# Patient Record
Sex: FEMALE | Race: BLACK OR AFRICAN AMERICAN | Employment: UNEMPLOYED | URBAN - METROPOLITAN AREA
[De-identification: names, ages, dates, MRNs, and addresses within clinical notes are randomized per-mention and may not be internally consistent; named-entity substitution may affect disease eponyms.]

---

## 2018-10-09 ENCOUNTER — OFFICE VISIT (OUTPATIENT)
Dept: BARIATRICS | Facility: CLINIC | Age: 38
End: 2018-10-09
Payer: COMMERCIAL

## 2018-10-09 VITALS
RESPIRATION RATE: 14 BRPM | WEIGHT: 172.4 LBS | BODY MASS INDEX: 31.73 KG/M2 | TEMPERATURE: 97.7 F | HEIGHT: 62 IN | SYSTOLIC BLOOD PRESSURE: 116 MMHG | HEART RATE: 68 BPM | DIASTOLIC BLOOD PRESSURE: 60 MMHG

## 2018-10-09 DIAGNOSIS — K21.9 GASTROESOPHAGEAL REFLUX DISEASE, ESOPHAGITIS PRESENCE NOT SPECIFIED: ICD-10-CM

## 2018-10-09 DIAGNOSIS — R63.5 ABNORMAL WEIGHT GAIN: Primary | ICD-10-CM

## 2018-10-09 DIAGNOSIS — E55.9 VITAMIN D DEFICIENCY: ICD-10-CM

## 2018-10-09 DIAGNOSIS — G43.109 MIGRAINE WITH AURA AND WITHOUT STATUS MIGRAINOSUS, NOT INTRACTABLE: ICD-10-CM

## 2018-10-09 DIAGNOSIS — E66.9 CLASS 1 OBESITY: ICD-10-CM

## 2018-10-09 PROCEDURE — 99204 OFFICE O/P NEW MOD 45 MIN: CPT | Performed by: PHYSICIAN ASSISTANT

## 2018-10-09 RX ORDER — IBUPROFEN 200 MG
TABLET ORAL EVERY 6 HOURS PRN
COMMUNITY

## 2018-10-09 RX ORDER — ERGOCALCIFEROL 1.25 MG/1
50000 CAPSULE ORAL WEEKLY
Qty: 4 CAPSULE | Refills: 0 | Status: SHIPPED | OUTPATIENT
Start: 2018-10-09

## 2018-10-09 NOTE — PROGRESS NOTES
Assessment/Plan:    Class 1 obesity  -Discussed options of [facility]-Intensive Lifestyle Intervention Program, Very Low Calorie Diet-VLCD and Conservative Program and the role of weight loss medications   -Initial weight loss goal of 5-10% weight loss for improved health  -Screening labs: Check fasting insulin, otherwise within acceptable limits from care everywhere  -Would be cautious with Topamax due to hx of nephrolithiasis (occured on one occasion in 2003)  -Patient is interested in pursuing Very Low Calorie Diet-VLCD with transition to [facility]   -Of note, patient avoids pork product    Labs ordered: yes- only due for fasting insulin- no C-I to starting VLCD  HTN meds addressed: n/a  DM2 meds addressed: n/a  VLCD time restriction based on BMI: n/a    Vitamin D deficiency  -20 in 05/2018  -start ergocalciferol x4 weeks then OTC vitamin D 4957-6019 IU daily for maintenance  -recheck in January- Recommend checking lab coverage before having labs drawn    Migraine with aura and without status migrainosus, not intractable  -occur infrequently  -minimizes use of artificial sweeteners, but never noticed correlation    GERD (gastroesophageal reflux disease)  -avoid triggers as discussed  -may improve with weight loss and dietary changes    Follow up for VLCD start    45 minute visit, >50% time spent counseling patient on nonsurgical interventions for the treatment of excess weight  Discussed in detail nonsurgical options including intensive lifestyle intervention program, very low-calorie diet program and conservative program   Discussed the role of weight loss medications  Counseled patient on diet behavior and exercise modification for weight loss  Goals:  Food log (ie ) www [email or web address]  [email or web address]  [email or web address],etc  baritastic  No sugary beverages  At least 80 oz of water daily    Recommend checking lab coverage before having labs drawn  After prescription vitamin D complete take 6231-6042 IU OTC vitamin D daily for maintenance or 88457 IU weekly    Diagnoses and all orders for this visit:    Abnormal weight gain  -     Insulin, fasting; Future    Vitamin D deficiency  -     ergocalciferol (VITAMIN D2) 50,000 units; Take 1 capsule (50,000 Units total) by mouth once a week  -     Vitamin D 25 hydroxy; Future  -     Insulin, fasting; Future    Class 1 obesity  -     Insulin, fasting; Future    Body mass index 31 0-31 9, adult    Migraine with aura and without status migrainosus, not intractable    Gastroesophageal reflux disease, esophagitis presence not specified    Other orders  -     ibuprofen (MOTRIN) 200 mg tablet; Take by mouth every 6 (six) hours as needed for mild pain    Subjective:   Chief Complaint   Patient presents with    Consult     MWM consult      Patient ID: Ronell Nissen  is a 41 y o  female with excess weight/obesity here to pursue weight management  Accompanied by her , who is also here for MWM  Past Medical History:   Diagnosis Date    GERD (gastroesophageal reflux disease)     Migraines     Nephrolithiasis     Stones 2003     HPI:  Obesity/Excess Weight:  Severity: Mild  Onset:  College and then pregnancy (25)    Modifiers: Diet and Exercise and 21 day fix  Contributing factors: Insufficient Physical Activity, Pregnancy and snacking, enjoys sweets  Associated symptoms: increased joint pain and increased shortness of breath    Goals: 135-140  Hydration: almost exclusively drinks water  Occasional coffee or juice  Alcohol: few drinks per month    The following portions of the patient's history were reviewed and updated as appropriate: allergies, current medications, past family history, past medical history, past social history, past surgical history and problem list     Review of Systems   Constitutional: Negative for chills and fever  HENT: Negative for sore throat      Respiratory: Positive for shortness of breath (attributed to weight loss, recommend eval if sx persist or worsen)  Negative for cough  Cardiovascular: Negative for chest pain and palpitations  Gastrointestinal: Negative for abdominal pain, constipation, diarrhea, nausea and vomiting  +GERD 1-2x/week- food triggers   Genitourinary: Negative for dysuria  Musculoskeletal: Positive for arthralgias  Skin: Negative for rash  Neurological: Positive for headaches (hx of migraines- not believed to be attributed to artificial sweeteners)  Psychiatric/Behavioral: Negative for suicidal ideas (Denies HI)  Objective:    /60 (BP Location: Right arm, Patient Position: Sitting, Cuff Size: Adult)   Pulse 68   Temp 97 7 °F (36 5 °C) (Tympanic)   Resp 14   Ht 5' 2" (1 575 m)   Wt 78 2 kg (172 lb 6 4 oz)   BMI 31 53 kg/m²     Physical Exam   Nursing note and vitals reviewed  Constitutional   General appearance: Abnormal   well developed and obese  Eyes No conjunctival pallor  Ears, Nose, Mouth, and Throat Oral mucosa moist    Pulmonary   Respiratory effort: No increased work of breathing or signs of respiratory distress  Auscultation of lungs: Clear to auscultation, equal breath sounds bilaterally, no wheezes, no rales, no rhonci  Cardiovascular   Auscultation of heart: Normal rate and rhythm, normal S1 and S2, without murmurs  Examination of extremities for edema and/or varicosities: Normal   no edema  Abdomen   Abdomen: Abnormal   The abdomen was obese  Bowel sounds were normal  The abdomen was soft and nontender     Musculoskeletal   Gait and station: Normal     Psychiatric   Orientation to person, place and time: Normal     Affect: appropriate

## 2018-10-09 NOTE — ASSESSMENT & PLAN NOTE
-Discussed options of [facility]-Intensive Lifestyle Intervention Program, Very Low Calorie Diet-VLCD and Conservative Program and the role of weight loss medications   -Initial weight loss goal of 5-10% weight loss for improved health  -Screening labs: Check fasting insulin, otherwise within acceptable limits from care everywhere  -Would be cautious with Topamax due to hx of nephrolithiasis (occured on one occasion in 2003)  -Patient is interested in pursuing Very Low Calorie Diet-VLCD with transition to [facility]   -Of note, patient avoids pork product    Labs ordered: yes- only due for fasting insulin- no C-I to starting VLCD  HTN meds addressed: n/a  DM2 meds addressed: n/a  VLCD time restriction based on BMI: n/a

## 2018-10-09 NOTE — ASSESSMENT & PLAN NOTE
-20 in 05/2018  -start ergocalciferol x4 weeks then OTC vitamin D 9552-5823 IU daily for maintenance  -recheck in January- Recommend checking lab coverage before having labs drawn

## 2018-10-12 ENCOUNTER — TELEPHONE (OUTPATIENT)
Dept: BARIATRICS | Facility: CLINIC | Age: 38
End: 2018-10-12

## 2018-10-12 NOTE — TELEPHONE ENCOUNTER
You are seeing the pt for VLCD start on 10/15/18  Received the fasting insulin from [employer]  Please let her know that it was within acceptable limits  Thank you! To be scanned into epic   Insulin - 13 7

## 2018-10-15 ENCOUNTER — OFFICE VISIT (OUTPATIENT)
Dept: BARIATRICS | Facility: CLINIC | Age: 38
End: 2018-10-15

## 2018-10-15 VITALS — BODY MASS INDEX: 32.13 KG/M2 | HEIGHT: 62 IN | WEIGHT: 174.6 LBS

## 2018-10-15 DIAGNOSIS — R63.5 ABNORMAL WEIGHT GAIN: ICD-10-CM

## 2018-10-15 PROCEDURE — VLCD

## 2018-10-15 PROCEDURE — RECHECK

## 2018-10-15 NOTE — PROGRESS NOTES
Initial RD session for VLCD completed  Components of diet discussed including ketosis, hydration, possible side effects  2 weeks of product ordered and received  Will f/u 10/18 via phone

## 2018-10-18 ENCOUNTER — PATIENT OUTREACH (OUTPATIENT)
Dept: BARIATRICS | Facility: CLINIC | Age: 38
End: 2018-10-18

## 2018-10-30 ENCOUNTER — OFFICE VISIT (OUTPATIENT)
Dept: BARIATRICS | Facility: CLINIC | Age: 38
End: 2018-10-30

## 2018-10-30 VITALS
WEIGHT: 161.9 LBS | BODY MASS INDEX: 29.79 KG/M2 | HEIGHT: 62 IN | SYSTOLIC BLOOD PRESSURE: 98 MMHG | DIASTOLIC BLOOD PRESSURE: 67 MMHG

## 2018-10-30 DIAGNOSIS — K21.9 GASTROESOPHAGEAL REFLUX DISEASE WITHOUT ESOPHAGITIS: ICD-10-CM

## 2018-10-30 DIAGNOSIS — R63.5 ABNORMAL WEIGHT GAIN: Primary | ICD-10-CM

## 2018-10-30 PROCEDURE — VLCD

## 2018-10-30 PROCEDURE — RECHECK

## 2018-10-30 NOTE — PROGRESS NOTES
Weight Management Medical Nutrition Assessment     Is here for VLCD f/u  Current wt: 161 9 lbs  Loss of 12 7 lbs x 2 weeks  Some hunger complaints due to having to cook meals for children  Discussed snack options she can utilize if needed  Hydration adequate  Will continue VLCD at this time  Due to BMI she is able to complete only an additional 4 wks  Anthropometric Measurements  Start Weight (lbs): 174 6 lbs  Current Weight (lbs): 161 9 lbs  TBW % Change from start weight: 7%  Ideal Body Weight (lbs):110 lbs  Goal Weight (lbs):135-140 lbs    Weight Loss History  Previous weight loss attempts: Exercise  Self Created Diets (Portion Control, Healthy Food Choices, etc )    Food and Nutrition Related History  Wake up: 5:45   Bed Time: midnight    Food Recall  Breakfast: 8:30 shake   Lunch:12-1 shake   Dinner:6 soup  Snack: bar time varies    Beverages: water  Volume of beverage intake: >80 oz    Weekends: Same  Cravings: n/a  Trouble area of day:when making meals for kids    Frequency of Eating out: n/a  Food restrictions:carbs <50 gm while on VLCD  Cooking: self   Food Shopping: self    Physical Activity Intake  Activity:none currently  Frequency:n/a  Physical limitations/barriers to exercise: no intense exercise while on VLCD     Estimated Needs  Energy  [employer] [place] Energy Needs: BMR : 5198   2# loss weekly sedentary:  653           Protein:60-75 gm     (1 2-1 5g/kg IBW)  Fluid: 58 oz    (35mL/kg IBW)    Nutrition Diagnosis  Yes; Overweight/obesity  related to Excess energy intake as evidenced by  BMI more than normative standard for age and sex (overweight 25-29  9)       Nutrition Intervention    Nutrition Prescription  Calories:760  Protein: 96 gm  Fluid: 80 oz    Meal Plan  3 meal replacements + 1 bar    Nutrition Education:    VLCD  Hydration    Nutrition Counseling:  Strategies:  hydration, fiber intake, protein intake      Monitoring and Evaluation:  Evaluation criteria:  Energy Intake  Meet protein needs  Maintain adequate hydration  Monitor weekly weight     Barriers to learning:none  Readiness to change: Action  Comprehension: very good  Expected Compliance: very good

## 2018-11-12 NOTE — PROGRESS NOTES
Weight Management Medical Nutrition Assessment      Is here for 4 week VLCD f/u  Current wt: 159 8#  She has lost 2 1# in the past 2 weeks and overall loss of 14 8# ( 8 4% TBW) in the past 4 weeks with    3 7# loss per week average  Tolerating meal replacements without difficulty  Consuming at least 80oz of water in addition to the the water used to mix replacements  Patient not experiencing constipation  Patient stated she would like to transition to a low calorie keto diet using partial meal replacement - developed meal plan      Anthropometric Measurements  Start Weight (lbs): 174 6 lbs  Current Weight (lbs): 159 8 lbs  TBW % Change from start weight: 8 4%  Ideal Body Weight (lbs):110 lbs  Goal Weight (lbs):135-140 lbs     Weight Loss History  Previous weight loss attempts: Exercise  Self Created Diets (Portion Control, Healthy Food Choices, etc )     Food and Nutrition Related History  Wake up: 5:45   Bed Time: midnight     Food Recall  Breakfast: 8:30 shake   Lunch:12-1 shake   Dinner:6 soup  Snack: bar time varies     Beverages: water  Volume of beverage intake: >80 oz     Weekends: Same  Cravings: n/a  Trouble area of day:when making meals for kids     Frequency of Eating out: n/a  Food restrictions: carbs <50 gm while on VLCD  Cooking: self   Food Shopping: self     Physical Activity Intake  Activity:none currently  Frequency:n/a  Physical limitations/barriers to exercise: no intense exercise while on VLCD      Estimated Needs  Energy  [employer] [place] Energy Needs: BMR : 1368 calories  1-2# loss weekly sedentary: 642-1142 calories   1-2# loss lightly active: 881-1381 calories          Protein:60-75 gm     (1 2-1 5g/kg IBW)  Fluid: 58 oz    (35mL/kg IBW)     Nutrition Diagnosis  Yes; Overweight/obesity  related to Excess energy intake as evidenced by  BMI more than normative standard for age and sex (overweight 25-29  9)     Nutrition Intervention     Nutrition Prescription  Calories:1224-1037 calories on sedentary days and flex to 1300 calories on cardio days  Protein: 75-90 gm  Fluid: 80 oz     Meal Plan  Low calorie keto using 2 meal replacements + 1 bar     Nutrition Education:    Low Calorie Keto  Hydration     Nutrition Counseling:  Strategies:  hydration, fiber intake, protein intake        Monitoring and Evaluation:  Evaluation criteria:  Energy Intake  Meet protein needs  Maintain adequate hydration  Monitor weekly weight      Barriers to learning:none  Readiness to change: Action  Comprehension: very good

## 2018-11-13 ENCOUNTER — OFFICE VISIT (OUTPATIENT)
Dept: BARIATRICS | Facility: CLINIC | Age: 38
End: 2018-11-13

## 2018-11-13 VITALS — HEIGHT: 62 IN | BODY MASS INDEX: 29.44 KG/M2 | WEIGHT: 160 LBS

## 2018-11-13 DIAGNOSIS — R63.5 ABNORMAL WEIGHT GAIN: ICD-10-CM

## 2018-11-13 PROCEDURE — VLCD: Performed by: DIETITIAN, REGISTERED

## 2018-11-13 PROCEDURE — RECHECK: Performed by: DIETITIAN, REGISTERED

## 2018-12-11 ENCOUNTER — OFFICE VISIT (OUTPATIENT)
Dept: BARIATRICS | Facility: CLINIC | Age: 38
End: 2018-12-11

## 2018-12-11 VITALS — HEIGHT: 62 IN | WEIGHT: 155.6 LBS | BODY MASS INDEX: 28.63 KG/M2

## 2018-12-11 DIAGNOSIS — R63.5 ABNORMAL WEIGHT GAIN: ICD-10-CM

## 2018-12-11 PROCEDURE — WEIGHT: Performed by: DIETITIAN, REGISTERED

## 2018-12-11 PROCEDURE — RECHECK: Performed by: DIETITIAN, REGISTERED
